# Patient Record
Sex: FEMALE | Race: WHITE
[De-identification: names, ages, dates, MRNs, and addresses within clinical notes are randomized per-mention and may not be internally consistent; named-entity substitution may affect disease eponyms.]

---

## 2019-11-05 ENCOUNTER — HOSPITAL ENCOUNTER (OUTPATIENT)
Dept: HOSPITAL 75 - OCC | Age: 64
End: 2019-11-05
Attending: FAMILY MEDICINE

## 2019-11-05 DIAGNOSIS — Z02.1: Primary | ICD-10-CM

## 2019-11-05 PROCEDURE — 71046 X-RAY EXAM CHEST 2 VIEWS: CPT

## 2019-11-05 NOTE — DIAGNOSTIC IMAGING REPORT
INDICATION:  

Employment screening.



TECHNIQUE:  

Two view chest at 2:44 PM.



CORRELATION STUDY:   

None.



FINDINGS: 

Extensive cervical, thoracic, and lumbar spinal fixation hardware

is noted throughout the entirety of the thoracic spine. This

includes transpedicular screws and interconnecting rods. There is

an essentially vertebra plana at what appears to be the T7

vertebral body. The heart size, mediastinal configuration, and

pulmonary vasculature are within normal limits. The lungs are

clear with no consolidating infiltrate. There is no significant

pleural effusion or pneumothorax. The visualized osseous

structures are unremarkable.



IMPRESSION: 

Negative for acute abnormality of the chest.



Dictated by: 



  Dictated on workstation # [id number]